# Patient Record
(demographics unavailable — no encounter records)

---

## 2025-01-23 NOTE — HISTORY OF PRESENT ILLNESS
[FreeTextEntry1] : 53Fpresents for initial evaluation of bacteriuria  Referred by Nephrology, Dr. Travis    PMH significant for: ADPKD, HTN, HLD, thyroid ca  PSH significant for: thyroidectomy  Significant meds: HCTZ, Valsartan, atorvastatin, levothyroxine   Patient reports having 3 UTIs in the past year  There have been 0 culture-proven infections  Offending bacteria include: 0 Acute Symptoms of: No symptoms Denies associated hospitalizations.  Current UTI prevention regimen: episodic antibiotics   Denies incontinence or voiding complaints at baseline  Daytime Frequency: 3 Nighttime Frequency: 1-2 Pads per day: 0

## 2025-01-23 NOTE — REVIEW OF SYSTEMS
[TextEntry] : Constitutional: well nourished.  Pulmonary: no respiratory distress, no accessory muscle use, normal respiratory rate and effort.   Musculoskeletal: no joint swelings  Psychiatric: oriented to person, place, and time, the affect was normal, insight and judgment were intact and the mood was normal.

## 2025-01-23 NOTE — HISTORY OF PRESENT ILLNESS
[FreeTextEntry1] : Patient is a 51-year-old woman, here to follow-up for papillary thyroid cancer, and obesity.  Regarding papillary thyroid cancer, T1N0, stage I, 1.1 cm, with no adverse pathological features, following total thyroidectomy with Dr. Moreno in January 2009.  No radioactive iodine.  Most recent she was following up with Dr. Nicole Tirado at [facility].  Transfer care here in September 2020.  The above information is obtained through Dr. Tirado's note.  Which was dated in 2016.  Currently on LT4 112mcg daily.    Regarding her obesity, establish care with weight management with normal fall health.  Patient was treated with phentermine and Topamax.  She had effect of weight loss with a 60 pound weight loss over the last few months.  She recently saw her primary care doctor.  And was advised not to continue with the phentermine given that she has elevated blood pressure in the office.  She was also having GERD-like symptoms for the last few months, status post multiple work-up with endoscopies without any conclusion to the etiology.  Patient finally underwent gastric sleeve surgery in September 2022.  She had lost about 40 pounds since the surgery.  She is feeling so much better.  Prior medications: Phentermine, Topamax, Rybelsus, however unable to tolerate her medications.  Patient is status post gastric sleeve surgery with [facility].  She lost a significant amount of weight.  She is following with them on a regular basis including surgeons, nutritionist.  Regarding PTC, patient is status post a repeat ultrasound in December 2022 which is notable for a Prominent 1.9 x 0.7 x 1.2 cm right cervical level 2 lymph node that was indeterminate.  Status post FNA which is benign.  She is currently on levothyroxine 137 mcg once daily.  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   Patient asked for sooner visit because she went to her primary care doctor once found to have abnormal thyroid function level.  She was told that her TSH was too suppressed.  Patient is currently on levothyroxine 137 mcg once daily.  She endorsed taking biotin supplements, large amounts, given hair loss.

## 2025-01-23 NOTE — ASSESSMENT
[FreeTextEntry1] :  Ms. Massey presents for initial evaluation of asymptomatic bacteriuria She does not meet criteria for recurrent UTIs Discussed bacteriuria in the absence of symptoms, particularly in women, is of no concern No associated vaginal or irritative complaints to suggest underlying vaginal atrophy Patient is not post-menopausal  Recommendations -Observation -RTC as needed  I have spent 30 minutes of time on the encounter which excludes teaching and separately reported services

## 2025-01-23 NOTE — ASSESSMENT
[FreeTextEntry1] : Ms. VI TAYLOR is 49 year old woman with history of obesity.  1. Obesity. Status post gastric bypass. She was on phentermine and Topamax before but unable to tolerate secondary to persistent elevated BP Her surgery was done at [facility] located in [place], [place]. She is following up with an nutritionist as well as the surgeons today. She informing that her vitamin levels and mineral levels were checked by their team. Continue with diet modifications to maintain weight loss.  2. Thyroid cancer Multifocal papillary thyroid cancer T1N0, 1.1cm, no adverse pathologic features. s/p Total thyroidectomy with Dr. Moreno Jan 2009. No radioactive iodine She had thyroid ultrasound done recently and she will for me to report. It was done at [facility] radiology. She contact her primary care doctor's office already to have the report faxed to me We will check TSH, thyroglobulin level, and free T4 level. TSH goal should be between 0.5-2.0 if patient have good biochemical and structural response. Thyroid ultrasound in December 2022 notable for a prominent right cervical lymph node measuring 1.9 x 0.7 x 1.2 cm. She underwent fine-needle aspiration of the cervical lymph node in December 2022 and is fine to be most likely reactive lymph node. Repeat TG, TSH  3. Abnormal TSH  Patient has been taking biotin. Abnormal TFT might secondary to the effect of biotin on the TSH assay. Recommend holding biotin for at least 72 hours prior to repeating blood work.  Patient agreeable with plan. Check TSH, free T4, thyroglobulin level

## 2025-02-26 NOTE — PHYSICAL EXAM
[General Appearance - Alert] : alert [General Appearance - In No Acute Distress] : in no acute distress [Sclera] : the sclera and conjunctiva were normal [PERRL With Normal Accommodation] : pupils were equal in size, round, and reactive to light [Extraocular Movements] : extraocular movements were intact [Outer Ear] : the ears and nose were normal in appearance [Oropharynx] : the oropharynx was normal [Neck Appearance] : the appearance of the neck was normal [Neck Cervical Mass (___cm)] : no neck mass was observed [Jugular Venous Distention Increased] : there was no jugular-venous distention [Thyroid Diffuse Enlargement] : the thyroid was not enlarged [Thyroid Nodule] : there were no palpable thyroid nodules [] : no respiratory distress [Auscultation Breath Sounds / Voice Sounds] : lungs were clear to auscultation bilaterally [Heart Rate And Rhythm] : heart rate was normal and rhythm regular [Heart Sounds] : normal S1 and S2 [Heart Sounds Gallop] : no gallops [Murmurs] : no murmurs [Heart Sounds Pericardial Friction Rub] : no pericardial rub [Edema] : there was no peripheral edema [No CVA Tenderness] : no ~M costovertebral angle tenderness [No Spinal Tenderness] : no spinal tenderness

## 2025-02-26 NOTE — ASSESSMENT
[FreeTextEntry1] : Ms. TAYLOR is a 53 year old female with likely ADPKD and CKD stage 1 now. She has FH of polycystic renal disease who I take care of who have needed dialysis and eventually a renal transplant.  HTN may be related to her underlying renal disease  1. MRI done confirmed ADPKD and MRI of brain was neg for any PKD related aneursyms 2. Increased water intake to 2-3 liters per day- to help decrease cyst size 3. BP controlled on thiazide and SALT study guidelines 4. Baseline CKD labs done as well today   f/u 5-6 months